# Patient Record
Sex: FEMALE | Race: WHITE | NOT HISPANIC OR LATINO | URBAN - METROPOLITAN AREA
[De-identification: names, ages, dates, MRNs, and addresses within clinical notes are randomized per-mention and may not be internally consistent; named-entity substitution may affect disease eponyms.]

---

## 2022-02-25 ENCOUNTER — OUTPATIENT (OUTPATIENT)
Dept: OUTPATIENT SERVICES | Facility: HOSPITAL | Age: 37
LOS: 1 days | Discharge: HOME | End: 2022-02-25

## 2022-02-25 VITALS
DIASTOLIC BLOOD PRESSURE: 74 MMHG | HEART RATE: 90 BPM | SYSTOLIC BLOOD PRESSURE: 143 MMHG | TEMPERATURE: 99 F | RESPIRATION RATE: 18 BRPM

## 2022-02-25 VITALS — SYSTOLIC BLOOD PRESSURE: 108 MMHG | HEART RATE: 73 BPM | DIASTOLIC BLOOD PRESSURE: 68 MMHG

## 2022-02-25 DIAGNOSIS — O26.893 OTHER SPECIFIED PREGNANCY RELATED CONDITIONS, THIRD TRIMESTER: ICD-10-CM

## 2022-02-25 LAB — SARS-COV-2 RNA SPEC QL NAA+PROBE: SIGNIFICANT CHANGE UP

## 2022-02-25 RX ADMIN — Medication 0.25 MILLIGRAM(S): at 10:30

## 2022-02-25 NOTE — BRIEF OPERATIVE NOTE - OPERATION/FINDINGS
Viable fetus in yessy breech position. Posterior placenta. MVP 7 cm. Category I tracing pre-procedure. ECV attempted after administration of subcutaneous terbutaline without success. Persistent breech position with category I tracing post procedure

## 2022-02-25 NOTE — OB PROVIDER TRIAGE NOTE - NSOBPROVIDERNOTE_OBGYN_ALL_OB_FT
36y  at 37w6d, GBS pos, AMA with low risk NIPTs, for ECV for persistent breech presentation  -0.25mg terbutaline ordered  -COVID swab done, awaiting results  -OR on standby    Dr. Laurent and Dr. Puga to be aware

## 2022-02-25 NOTE — OB PROVIDER TRIAGE NOTE - NSHPLABSRESULTS_GEN_ALL_CORE
Sonogram:  10w2d NT wnl  24w3d normal anatomy, suboptimal cardiac views  32w4d EFW 57%    8/16  Type and Screen:  O pos  Antibody screen: neg   Rubella: immune  Rubeola: immune  RPR: neg  HbSAg: neg      Third Trimester: 2/17/22  GBS: pos

## 2022-02-25 NOTE — OB PROVIDER TRIAGE NOTE - NS_OBGYNHISTORY_OBGYN_ALL_OB_FT
OB: P1 2/20 ectopic  P2 8/20 SAB  P3 chemical pregnancy    GYN: denies history of fibroids, ovarian cysts, abnormal papsmears, and STIs

## 2022-02-25 NOTE — OB RN TRIAGE NOTE - NSNURSINGINSTR_OBGYN_ALL_OB_FT
Follow up with Dr Laurent at next sched appointment. Drink plenty of fluids. Come back to labor and delivery if your water breaks, you have contractions 3-5 min for at least an  hour. Baby kicking,movement 10x/hr

## 2022-02-25 NOTE — OB PROVIDER TRIAGE NOTE - NSHPPHYSICALEXAM_GEN_ALL_CORE
HR: 90 (02-25-22 @ 09:50) (90 - 90)  BP: 143/74 (02-25-22 @ 09:50) (143/74 - 143/74)    Gen: A+OX3. NAD  Abd: Soft, Nontender. Gravid. No palpable contractions  SVE: deferred    FHR: 145BPM/mod shahnaz/accels pos  TOCO: irritability      EFW by Leopolds: 3600

## 2022-02-25 NOTE — OB RN TRIAGE NOTE - FALL HARM RISK - HARM RISK INTERVENTIONS

## 2022-02-25 NOTE — OB PROVIDER TRIAGE NOTE - ADDITIONAL INSTRUCTIONS
Please return to L&D if you experience leakage of fluid, vaginal bleeding, frequent contractions or decreased fetal movement.  Follow-up with your OB in 1 week to confirm presentation and plan delivery accordingly.

## 2022-02-25 NOTE — CHART NOTE - NSCHARTNOTEFT_GEN_A_CORE
Procedure Note:   PreOp Dx: Yessy Breech presentation, prior vaginal delivery  PostOp Dx: Yessy Breech presentation, unsuccesful version  Procedure: External Cephalic Version  Findings: Stable fetal HR pre and post procedure    A bedside ultrasound was performed which confirmed the single intrauterine pregnancy and a yessy breech presentation. There was noted to be adequate fluid (MVP 7cm) and a BPP was 8/8. The fetal pelvis was located in the maternal pelvis, spine along the materal left side, and head in the materna RUQ. Using manual pressure, the fetus was manipulated with gentle pressure from the palms against the buttock and posterior occupit to stimulate a forward roll. In total, four attempts where made. Fetal HRs were obtained between each attempt and were reassuring, between 125-135 after each attempt.  All attempts were unsusuccessful. Following the procedure, she was noted to have a reassuring and reactive tracing for 1 hour post procedure. She did not have any regular contractions and there were no signs of PROM. She was discharged home with instructions on reasons to return to L&D and otherwise she will follow up in clinic in one week to confirm presentation and schedule primary  section    Dr. Laurent at bedside

## 2022-02-25 NOTE — OB PROVIDER TRIAGE NOTE - HISTORY OF PRESENT ILLNESS
36y  at 37w6d dated by LMP presenting for ECV.  36y  at 37w6d dated by LMP presenting for ECV for persistent breech presentation. She denies LOF, VB, CTX. Good FM. AMA with low risk NIPTs. Denies complications this pregnancy.

## 2022-02-25 NOTE — PRE-ANESTHESIA EVALUATION ADULT - NSANTHOSAYNRD_GEN_A_CORE
No. EDIL screening performed.  STOP BANG Legend: 0-2 = LOW Risk; 3-4 = INTERMEDIATE Risk; 5-8 = HIGH Risk

## 2022-02-25 NOTE — CHART NOTE - NSCHARTNOTEFT_GEN_A_CORE
PGY3 Note    Patient now with 2 hours of cat I tracing.  Patient remains asymptomatic.    EFM: 130/mod/+accel  toco: irregular    Instructed to follow up with provider at regular OB visit.  Labor precautions discussed, PO hydration encouraged, FKC encouraged.    Dr. Laurent aware.

## 2022-06-29 NOTE — OB RN TRIAGE NOTE - AS TEMP SITE
Michele Sanders was seen and treated in our emergency department on 6/29/2022. She may return to work on 07/04/2022. Patient seen and evaluated in the ED and should not return to work until July 4, 2002     If you have any questions or concerns, please don't hesitate to call.       Aviva Ochoa MD oral
